# Patient Record
Sex: FEMALE | Race: WHITE
[De-identification: names, ages, dates, MRNs, and addresses within clinical notes are randomized per-mention and may not be internally consistent; named-entity substitution may affect disease eponyms.]

---

## 2021-01-07 ENCOUNTER — HOSPITAL ENCOUNTER (OUTPATIENT)
Dept: HOSPITAL 53 - M SLEEP HO | Age: 56
End: 2021-01-07
Attending: NURSE PRACTITIONER
Payer: COMMERCIAL

## 2021-01-07 DIAGNOSIS — R06.83: Primary | ICD-10-CM

## 2021-01-08 NOTE — SLEEPHOME
DIAGNOSTIC HOME SLEEP TESTING





DATE:  01/07/2021



ORDERED BY: ALYSSA Quintanilla  



Diagnostic home sleep testing was performed due to concern for the obstructive

sleep apnea syndrome. 



For testing, a nocturnal T3 respiratory monitoring device was used. Continuous

record was made of pulse, oxygen saturation, air flow, chest and abdominal

strain, and body position.



9 hours and 59 minutes of data were reviewed. There were 6 hours and 38 minutes

marked as time in bed. During the interval marked time in bed, there were 308

respiratory events identified of 10 seconds in duration or greater for a

respiratory event index 46.4. The events were obstructive. Baseline pulse rate

was 79 beats per minute. The pulse rate ranged between 61 and 107. Baseline

saturation was 92%. Saturations fell to regularly below 80 to lowest saturation

of 80%. Testing was performed in both the supine and non-supine positions. 



IMPRESSION: Abnormal home sleep testing with repetitive respiratory events and

oxygen desaturations to 80% with a respiratory event index of 46.4 is

consistent with the obstructive sleep apnea syndrome. 



RECOMMENDATION: The patient should be encouraged to undergo a formal sleep

evaluation. 







LUIS Gonzalez

## 2022-06-30 ENCOUNTER — HOSPITAL ENCOUNTER (EMERGENCY)
Dept: HOSPITAL 53 - M ED | Age: 57
Discharge: HOME | End: 2022-06-30
Payer: COMMERCIAL

## 2022-06-30 VITALS — SYSTOLIC BLOOD PRESSURE: 157 MMHG | DIASTOLIC BLOOD PRESSURE: 86 MMHG

## 2022-06-30 VITALS — HEIGHT: 65 IN | BODY MASS INDEX: 36.73 KG/M2 | WEIGHT: 220.46 LBS

## 2022-06-30 DIAGNOSIS — Z23: ICD-10-CM
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
## 2022-06-30 DIAGNOSIS — Z79.899: ICD-10-CM

## 2022-06-30 DIAGNOSIS — Z88.5: ICD-10-CM

## 2022-06-30 DIAGNOSIS — Z88.6: ICD-10-CM
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
## 2022-06-30 DIAGNOSIS — Z77.21: Primary | ICD-10-CM
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
## 2022-06-30 LAB
ALBUMIN SERPL BCG-MCNC: 4 GM/DL (ref 3.2–5.2)
ALT SERPL W P-5'-P-CCNC: 33 U/L (ref 12–78)
BASOPHILS # BLD AUTO: 0 10^3/UL (ref 0–0.2)
BASOPHILS NFR BLD AUTO: 0.6 % (ref 0–1)
BILIRUB SERPL-MCNC: 0.4 MG/DL (ref 0.2–1)
BUN SERPL-MCNC: 20 MG/DL (ref 7–18)
CALCIUM SERPL-MCNC: 9.5 MG/DL (ref 8.5–10.1)
CHLORIDE SERPL-SCNC: 107 MEQ/L (ref 98–107)
CO2 SERPL-SCNC: 30 MEQ/L (ref 21–32)
CREAT SERPL-MCNC: 0.71 MG/DL (ref 0.55–1.3)
EOSINOPHIL # BLD AUTO: 0.1 10^3/UL (ref 0–0.5)
EOSINOPHIL NFR BLD AUTO: 1.7 % (ref 0–3)
GFR SERPL CREATININE-BSD FRML MDRD: > 60 ML/MIN/{1.73_M2} (ref 51–?)
GLUCOSE SERPL-MCNC: 102 MG/DL (ref 70–100)
HBV SURFACE AB SER QL: NEGATIVE
HBV SURFACE AB SER-ACNC: NEGATIVE M[IU]/ML
HCT VFR BLD AUTO: 40.4 % (ref 36–47)
HCV AB SER QL: 0.1 INDEX (ref ?–0.8)
HGB BLD-MCNC: 12.9 G/DL (ref 12–15.5)
HIV 1+2 AB+HIV1 P24 AG SERPL QL IA: NEGATIVE
LYMPHOCYTES # BLD AUTO: 1.6 10^3/UL (ref 1.5–5)
LYMPHOCYTES NFR BLD AUTO: 33.8 % (ref 24–44)
MCH RBC QN AUTO: 27.6 PG (ref 27–33)
MCHC RBC AUTO-ENTMCNC: 31.9 G/DL (ref 32–36.5)
MCV RBC AUTO: 86.5 FL (ref 80–96)
MONOCYTES # BLD AUTO: 0.4 10^3/UL (ref 0–0.8)
MONOCYTES NFR BLD AUTO: 7.7 % (ref 2–8)
NEUTROPHILS # BLD AUTO: 2.6 10^3/UL (ref 1.5–8.5)
NEUTROPHILS NFR BLD AUTO: 56 % (ref 36–66)
PLATELET # BLD AUTO: 188 10^3/UL (ref 150–450)
POTASSIUM SERPL-SCNC: 3.5 MEQ/L (ref 3.5–5.1)
PROT SERPL-MCNC: 6.4 GM/DL (ref 6.4–8.2)
RBC # BLD AUTO: 4.67 10^6/UL (ref 4–5.4)
SODIUM SERPL-SCNC: 141 MEQ/L (ref 136–145)
WBC # BLD AUTO: 4.7 10^3/UL (ref 4–10)